# Patient Record
Sex: MALE | Race: WHITE | NOT HISPANIC OR LATINO | URBAN - NONMETROPOLITAN AREA
[De-identification: names, ages, dates, MRNs, and addresses within clinical notes are randomized per-mention and may not be internally consistent; named-entity substitution may affect disease eponyms.]

---

## 2018-05-02 ENCOUNTER — TRANSCRIBE ORDERS (OUTPATIENT)
Dept: ADMINISTRATIVE | Facility: HOSPITAL | Age: 63
End: 2018-05-02

## 2018-05-02 DIAGNOSIS — Z87.891 PERSONAL HISTORY OF TOBACCO USE, PRESENTING HAZARDS TO HEALTH: Primary | ICD-10-CM

## 2022-04-04 ENCOUNTER — OFFICE VISIT (OUTPATIENT)
Dept: INTERNAL MEDICINE | Facility: CLINIC | Age: 67
End: 2022-04-04

## 2022-04-04 VITALS
SYSTOLIC BLOOD PRESSURE: 114 MMHG | HEART RATE: 90 BPM | RESPIRATION RATE: 16 BRPM | OXYGEN SATURATION: 96 % | WEIGHT: 208 LBS | HEIGHT: 71 IN | DIASTOLIC BLOOD PRESSURE: 78 MMHG | BODY MASS INDEX: 29.12 KG/M2

## 2022-04-04 DIAGNOSIS — Z76.89 ENCOUNTER TO ESTABLISH CARE: ICD-10-CM

## 2022-04-04 DIAGNOSIS — Z23 NEED FOR VACCINATION FOR STREP PNEUMONIAE: ICD-10-CM

## 2022-04-04 DIAGNOSIS — Z12.11 ENCOUNTER FOR SCREENING COLONOSCOPY: ICD-10-CM

## 2022-04-04 DIAGNOSIS — F31.74 BIPOLAR DISORDER, IN FULL REMISSION, MOST RECENT EPISODE MANIC: Primary | ICD-10-CM

## 2022-04-04 PROBLEM — H35.30 MACULAR DEGENERATION OF BOTH EYES: Status: ACTIVE | Noted: 2022-04-04

## 2022-04-04 PROCEDURE — 90732 PPSV23 VACC 2 YRS+ SUBQ/IM: CPT

## 2022-04-04 PROCEDURE — 99204 OFFICE O/P NEW MOD 45 MIN: CPT

## 2022-04-04 PROCEDURE — G0009 ADMIN PNEUMOCOCCAL VACCINE: HCPCS

## 2022-04-04 RX ORDER — VILAZODONE HYDROCHLORIDE 40 MG/1
TABLET ORAL
COMMUNITY
Start: 2022-03-17

## 2022-04-04 RX ORDER — LAMOTRIGINE 150 MG/1
300 TABLET ORAL DAILY
COMMUNITY
Start: 2022-03-17

## 2022-04-04 RX ORDER — ANTIOX #8/OM3/DHA/EPA/LUT/ZEAX 250-2.5 MG
1 CAPSULE ORAL DAILY
COMMUNITY

## 2022-04-04 RX ORDER — BUPROPION HYDROCHLORIDE 300 MG/1
300 TABLET ORAL DAILY
COMMUNITY
Start: 2022-03-17

## 2022-04-04 RX ORDER — OLANZAPINE 15 MG/1
TABLET ORAL
COMMUNITY
Start: 2022-03-17

## 2022-04-04 RX ORDER — DIAZEPAM 5 MG/1
TABLET ORAL
COMMUNITY
Start: 2022-01-12

## 2022-04-04 NOTE — PROGRESS NOTES
Chief Complaint  Establish Care (Pt would like colonoscopy order, discuss meds)    Stefan Hughes presents to [facility]    Previous PCP: Stefan Day MD.     - Dr. Reggie Wren managing Wellbutrin, valium, lamotrigine, zyprexa, and viibryd for management of Bipolar Disorder. Reports that symptoms have been controlled for sometime. Follows with Dr. Wren every 3 months.     Subjective         [facility]  The following portions of the patient's history were reviewed and updated as appropriate: allergies, current medications, past family history, past medical history, past social history, past surgical history and problem list.     Past Medical History:   Diagnosis Date   • Anxiety    • Bipolar 1 disorder, mixed (HCC)    • Depression    • Intermediate stage nonexudative age-related macular degeneration of both eyes       No Known Allergies   Social History     Tobacco Use   • Smoking status: Former Smoker     Packs/day: 0.50     Types: Cigarettes     Start date: 1995     Quit date: 1997     Years since quittin.0   • Smokeless tobacco: Never Used   Substance Use Topics   • Alcohol use: Never   • Drug use: Never     Past Surgical History:   Procedure Laterality Date   • APPENDECTOMY        History reviewed. No pertinent family history.      Current Outpatient Medications:   •  buPROPion XL (WELLBUTRIN XL) 300 MG 24 hr tablet, Take 300 mg by mouth Daily., Disp: , Rfl:   •  diazePAM (VALIUM) 5 MG tablet, TAKE 1-2 TABLETS BY MOUTH THREE TIMES A DAY AS DIRECTED, Disp: , Rfl:   •  lamoTRIgine (LaMICtal) 150 MG tablet, Take 300 mg by mouth Daily., Disp: , Rfl:   •  multivitamins-minerals (PRESERVISION AREDS 2) capsule capsule, Take 1 capsule by mouth Daily., Disp: , Rfl:   •  OLANZapine (zyPREXA) 15 MG tablet, TAKE ONE TABLET BY MOUTH ONCE OR TWICE DAILY AS DIRECTED, Disp: , Rfl:   •  Viibryd 40 MG tablet tablet, TAKE 1/2 TO 1 TABLET BY MOUTH IN THE MORNING AS DIRECTED, Disp: , Rfl:  "    Review of Systems   Constitutional: Negative for activity change, appetite change, chills, fatigue and fever.   Respiratory: Negative for apnea, cough, choking, chest tightness, shortness of breath, wheezing and stridor.    Cardiovascular: Negative for chest pain, palpitations and leg swelling.   Skin: Negative for color change, dry skin, pallor, rash, skin lesions, wound and bruise.   Allergic/Immunologic: Negative for environmental allergies and food allergies.   Neurological: Negative for dizziness and confusion.       Objective   Vital Signs  /78   Pulse 90   Resp 16   Ht 180.3 cm (71\")   Wt 94.3 kg (208 lb)   SpO2 96%   BMI 29.01 kg/m²     Physical Exam  Constitutional:       Appearance: Normal appearance.      Comments: Overweight    HENT:      Head: Normocephalic.      Mouth/Throat:      Mouth: Mucous membranes are moist.      Pharynx: Oropharynx is clear.   Eyes:      Extraocular Movements: Extraocular movements intact.      Pupils: Pupils are equal, round, and reactive to light.   Cardiovascular:      Rate and Rhythm: Normal rate and regular rhythm.      Pulses: Normal pulses.      Heart sounds: Normal heart sounds.   Pulmonary:      Effort: Pulmonary effort is normal.      Breath sounds: Normal breath sounds.   Skin:     General: Skin is warm and dry.      Capillary Refill: Capillary refill takes less than 2 seconds.   Neurological:      Mental Status: He is alert and oriented to person, place, and time.   Psychiatric:         Mood and Affect: Mood normal.         Behavior: Behavior normal.         Thought Content: Thought content normal.         Judgment: Judgment normal.          Result Review :     The following data was reviewed by: LINA Forbes on 04/04/2022:    Assessment and Plan    1. Need for vaccination for Strep pneumoniae  - Pneumococcal Polysaccharide Vaccine 23-Valent (PPSV23) Greater Than or Equal To 3yo Subcutaneous / IM    2. Bipolar disorder, in full remission, most " recent episode manic (HCC)  - TSH Rfx On Abnormal To Free T4; Future  - Encouraged to continue to take medications as prescribed and continue to follow with psychiatry.     3. Encounter for screening colonoscopy  - Ambulatory Referral For Screening Colonoscopy    4. Encounter to establish care  - CBC & Differential; Future  - Comprehensive Metabolic Panel; Future  - TSH Rfx On Abnormal To Free T4; Future  - Lipid Panel; Future  - Hepatitis C Antibody; Future    5. Body mass index (BMI) 29.0-29.9, adult   - CBC & Differential; Future  - Comprehensive Metabolic Panel; Future  - TSH Rfx On Abnormal To Free T4; Future  - Lipid Panel; Future  - Overweight. Discussed need for improved diet and exercise.     Follow Up     Return in about 1 month (around 5/4/2022) for Medicare Wellness.    Patient was given instructions and counseling regarding his condition or for health maintenance advice. Please see specific information pulled into the AVS if appropriate.    Part of this note may be an electronic transcription/translation of spoken language to printed text using the Dragon Dictation System.    Electronically signed by:   LINA Forbes  04/04/2022

## 2022-04-08 ENCOUNTER — PATIENT ROUNDING (BHMG ONLY) (OUTPATIENT)
Dept: INTERNAL MEDICINE | Facility: CLINIC | Age: 67
End: 2022-04-08

## 2022-04-18 ENCOUNTER — LAB (OUTPATIENT)
Dept: LAB | Facility: HOSPITAL | Age: 67
End: 2022-04-18

## 2022-04-18 DIAGNOSIS — F31.74 BIPOLAR DISORDER, IN FULL REMISSION, MOST RECENT EPISODE MANIC: ICD-10-CM

## 2022-04-18 DIAGNOSIS — Z76.89 ENCOUNTER TO ESTABLISH CARE: ICD-10-CM

## 2022-04-18 LAB
BASOPHILS # BLD AUTO: 0.04 10*3/MM3 (ref 0–0.2)
BASOPHILS NFR BLD AUTO: 0.5 % (ref 0–1.5)
DEPRECATED RDW RBC AUTO: 41 FL (ref 37–54)
EOSINOPHIL # BLD AUTO: 0.24 10*3/MM3 (ref 0–0.4)
EOSINOPHIL NFR BLD AUTO: 2.8 % (ref 0.3–6.2)
ERYTHROCYTE [DISTWIDTH] IN BLOOD BY AUTOMATED COUNT: 12 % (ref 12.3–15.4)
HCT VFR BLD AUTO: 49.8 % (ref 37.5–51)
HGB BLD-MCNC: 16.7 G/DL (ref 13–17.7)
IMM GRANULOCYTES # BLD AUTO: 0.01 10*3/MM3 (ref 0–0.05)
IMM GRANULOCYTES NFR BLD AUTO: 0.1 % (ref 0–0.5)
LYMPHOCYTES # BLD AUTO: 4.73 10*3/MM3 (ref 0.7–3.1)
LYMPHOCYTES NFR BLD AUTO: 54.5 % (ref 19.6–45.3)
MCH RBC QN AUTO: 31.4 PG (ref 26.6–33)
MCHC RBC AUTO-ENTMCNC: 33.5 G/DL (ref 31.5–35.7)
MCV RBC AUTO: 93.6 FL (ref 79–97)
MONOCYTES # BLD AUTO: 0.64 10*3/MM3 (ref 0.1–0.9)
MONOCYTES NFR BLD AUTO: 7.4 % (ref 5–12)
NEUTROPHILS NFR BLD AUTO: 3.02 10*3/MM3 (ref 1.7–7)
NEUTROPHILS NFR BLD AUTO: 34.7 % (ref 42.7–76)
NRBC BLD AUTO-RTO: 0 /100 WBC (ref 0–0.2)
PLATELET # BLD AUTO: 261 10*3/MM3 (ref 140–450)
PMV BLD AUTO: 11.6 FL (ref 6–12)
RBC # BLD AUTO: 5.32 10*6/MM3 (ref 4.14–5.8)
WBC NRBC COR # BLD: 8.68 10*3/MM3 (ref 3.4–10.8)

## 2022-04-18 PROCEDURE — 80061 LIPID PANEL: CPT

## 2022-04-18 PROCEDURE — 86803 HEPATITIS C AB TEST: CPT

## 2022-04-18 PROCEDURE — 84443 ASSAY THYROID STIM HORMONE: CPT

## 2022-04-18 PROCEDURE — 80053 COMPREHEN METABOLIC PANEL: CPT

## 2022-04-18 PROCEDURE — 85025 COMPLETE CBC W/AUTO DIFF WBC: CPT

## 2022-04-19 LAB
ALBUMIN SERPL-MCNC: 4.8 G/DL (ref 3.5–5.2)
ALBUMIN/GLOB SERPL: 1.8 G/DL
ALP SERPL-CCNC: 64 U/L (ref 39–117)
ALT SERPL W P-5'-P-CCNC: 17 U/L (ref 1–41)
ANION GAP SERPL CALCULATED.3IONS-SCNC: 11 MMOL/L (ref 5–15)
AST SERPL-CCNC: 20 U/L (ref 1–40)
BILIRUB SERPL-MCNC: 0.2 MG/DL (ref 0–1.2)
BUN SERPL-MCNC: 19 MG/DL (ref 8–23)
BUN/CREAT SERPL: 17.6 (ref 7–25)
CALCIUM SPEC-SCNC: 9.7 MG/DL (ref 8.6–10.5)
CHLORIDE SERPL-SCNC: 106 MMOL/L (ref 98–107)
CHOLEST SERPL-MCNC: 261 MG/DL (ref 0–200)
CO2 SERPL-SCNC: 24 MMOL/L (ref 22–29)
CREAT SERPL-MCNC: 1.08 MG/DL (ref 0.76–1.27)
EGFRCR SERPLBLD CKD-EPI 2021: 74.3 ML/MIN/1.73
GLOBULIN UR ELPH-MCNC: 2.7 GM/DL
GLUCOSE SERPL-MCNC: 67 MG/DL (ref 65–99)
HCV AB SER DONR QL: NORMAL
HDLC SERPL-MCNC: 53 MG/DL (ref 40–60)
LDLC SERPL CALC-MCNC: 170 MG/DL (ref 0–100)
LDLC/HDLC SERPL: 3.16 {RATIO}
POTASSIUM SERPL-SCNC: 4.1 MMOL/L (ref 3.5–5.2)
PROT SERPL-MCNC: 7.5 G/DL (ref 6–8.5)
SODIUM SERPL-SCNC: 141 MMOL/L (ref 136–145)
TRIGL SERPL-MCNC: 203 MG/DL (ref 0–150)
TSH SERPL DL<=0.05 MIU/L-ACNC: 2.39 UIU/ML (ref 0.27–4.2)
VLDLC SERPL-MCNC: 38 MG/DL (ref 5–40)

## 2022-04-26 DIAGNOSIS — Z12.11 ENCOUNTER FOR SCREENING COLONOSCOPY: Primary | ICD-10-CM

## 2022-05-02 ENCOUNTER — OFFICE VISIT (OUTPATIENT)
Dept: INTERNAL MEDICINE | Facility: CLINIC | Age: 67
End: 2022-05-02

## 2022-05-02 VITALS
SYSTOLIC BLOOD PRESSURE: 138 MMHG | WEIGHT: 205 LBS | DIASTOLIC BLOOD PRESSURE: 88 MMHG | HEIGHT: 69 IN | HEART RATE: 88 BPM | OXYGEN SATURATION: 97 % | TEMPERATURE: 97.9 F | BODY MASS INDEX: 30.36 KG/M2

## 2022-05-02 DIAGNOSIS — E78.5 DYSLIPIDEMIA: Primary | ICD-10-CM

## 2022-05-02 DIAGNOSIS — Z00.00 MEDICARE ANNUAL WELLNESS VISIT, SUBSEQUENT: ICD-10-CM

## 2022-05-02 PROCEDURE — 99214 OFFICE O/P EST MOD 30 MIN: CPT

## 2022-05-02 PROCEDURE — 1170F FXNL STATUS ASSESSED: CPT

## 2022-05-02 PROCEDURE — 1159F MED LIST DOCD IN RCRD: CPT

## 2022-05-02 PROCEDURE — G0439 PPPS, SUBSEQ VISIT: HCPCS

## 2022-05-02 RX ORDER — ATORVASTATIN CALCIUM 20 MG/1
20 TABLET, FILM COATED ORAL DAILY
Qty: 90 TABLET | Refills: 2 | Status: SHIPPED | OUTPATIENT
Start: 2022-05-02

## 2022-05-02 NOTE — PROGRESS NOTES
The ABCs of the Annual Wellness Visit  Subsequent Medicare Wellness Visit    Chief Complaint   Patient presents with   • Medicare Wellness-subsequent      Subjective    History of Present Illness:  Stefan Hughes is a 67 y.o. male who presents for a Subsequent Medicare Wellness Visit.    The following portions of the patient's history were reviewed and   updated as appropriate: allergies, current medications, past family history, past medical history, past social history, past surgical history and problem list.    Compared to one year ago, the patient feels his physical   health is the same.    Compared to one year ago, the patient feels his mental   health is the same.    Recent Hospitalizations:  He was not admitted to the hospital during the last year.       Current Medical Providers:  Patient Care Team:  Lewis Torres APRN as PCP - General (Nurse Practitioner)    Outpatient Medications Prior to Visit   Medication Sig Dispense Refill   • buPROPion XL (WELLBUTRIN XL) 300 MG 24 hr tablet Take 300 mg by mouth Daily.     • diazePAM (VALIUM) 5 MG tablet TAKE 1-2 TABLETS BY MOUTH THREE TIMES A DAY AS DIRECTED     • lamoTRIgine (LaMICtal) 150 MG tablet Take 300 mg by mouth Daily.     • multivitamins-minerals (PRESERVISION AREDS 2) capsule capsule Take 1 capsule by mouth Daily.     • OLANZapine (zyPREXA) 15 MG tablet TAKE ONE TABLET BY MOUTH ONCE OR TWICE DAILY AS DIRECTED     • Viibryd 40 MG tablet tablet TAKE 1/2 TO 1 TABLET BY MOUTH IN THE MORNING AS DIRECTED     • Sod Picosulfate-Mag Ox-Cit Acd 10-3.5-12 MG-GM -GM/160ML solution Take 160 mL by mouth Take As Directed for 2 doses. 320 mL 0     No facility-administered medications prior to visit.       No opioid medication identified on active medication list. I have reviewed chart for other potential  high risk medication/s and harmful drug interactions in the elderly.          Aspirin is not on active medication list.  Aspirin use is indicated based on review of  "current medical condition/s. Pros and cons of this therapy have been discussed with this patient. Benefits of this medication outweigh potential harm.  Patient has been instructed to start taking this medication..    Patient Active Problem List   Diagnosis   • Bipolar disorder, in full remission, most recent episode manic (HCC)   • Macular degeneration of both eyes   • Dyslipidemia     Advance Care Planning  Advance Directive is not on file.  ACP discussion was held with the patient during this visit. Patient does not have an advance directive, information provided.    Review of Systems   Constitutional: Negative.    HENT: Negative.    Eyes: Negative.    Respiratory: Negative.    Cardiovascular: Negative.    Gastrointestinal: Negative.    Endocrine: Negative.    Genitourinary: Negative.    Musculoskeletal: Negative.    Skin: Negative.    Allergic/Immunologic: Negative.    Neurological: Negative.    Hematological: Negative.    Psychiatric/Behavioral: Negative.         Objective    Vitals:    05/02/22 0839 05/02/22 0905   BP: 138/88    Pulse: 105 88   Temp: 97.9 °F (36.6 °C)    SpO2: 97%    Weight: 93 kg (205 lb)    Height: 176 cm (69.29\")      BMI Readings from Last 1 Encounters:   05/02/22 30.02 kg/m²   BMI is above normal parameters. Recommendations include: exercise counseling and nutrition counseling    Does the patient have evidence of cognitive impairment? No    Physical Exam  Constitutional:       Appearance: Normal appearance. He is obese.   HENT:      Head: Normocephalic.      Mouth/Throat:      Mouth: Mucous membranes are moist.      Pharynx: Oropharynx is clear.   Eyes:      Extraocular Movements: Extraocular movements intact.      Conjunctiva/sclera: Conjunctivae normal.      Pupils: Pupils are equal, round, and reactive to light.   Cardiovascular:      Rate and Rhythm: Normal rate and regular rhythm.      Pulses: Normal pulses.      Heart sounds: Normal heart sounds.   Pulmonary:      Effort: Pulmonary " effort is normal.      Breath sounds: Normal breath sounds.   Abdominal:      General: Bowel sounds are normal.      Palpations: Abdomen is soft.   Skin:     General: Skin is warm and dry.      Capillary Refill: Capillary refill takes less than 2 seconds.   Neurological:      Mental Status: He is alert and oriented to person, place, and time.   Psychiatric:         Mood and Affect: Mood normal.         Behavior: Behavior normal.         Thought Content: Thought content normal.         Judgment: Judgment normal.       Lab Results   Component Value Date    TRIG 203 (H) 2022    HDL 53 2022     (H) 2022    VLDL 38 2022            HEALTH RISK ASSESSMENT    Smoking Status:  Social History     Tobacco Use   Smoking Status Former Smoker   • Packs/day: 0.50   • Types: Cigarettes   • Start date: 1995   • Quit date: 1997   • Years since quittin.0   Smokeless Tobacco Never Used     Alcohol Consumption:  Social History     Substance and Sexual Activity   Alcohol Use Never     Fall Risk Screen:    STEADI Fall Risk Assessment was completed, and patient is at LOW risk for falls.Assessment completed on:2022    Depression Screening:  PHQ-2/PHQ-9 Depression Screening 2022   Little Interest or Pleasure in Doing Things 0-->not at all   Feeling Down, Depressed or Hopeless 0-->not at all   PHQ-9: Brief Depression Severity Measure Score 0       Health Habits and Functional and Cognitive Screening:  No flowsheet data found.    Age-appropriate Screening Schedule:  Refer to the list below for future screening recommendations based on patient's age, sex and/or medical conditions. Orders for these recommended tests are listed in the plan section. The patient has been provided with a written plan.    Health Maintenance   Topic Date Due   • ZOSTER VACCINE (1 of 2) 2023 (Originally 2003)   • INFLUENZA VACCINE  2022   • TDAP/TD VACCINES (2 - Tdap) 2026               Assessment/Plan   CMS Preventative Services Quick Reference  Risk Factors Identified During Encounter  Depression/Dysphoria  Inactivity/Sedentary  Obesity/Overweight   The above risks/problems have been discussed with the patient.  Follow up actions/plans if indicated are seen below in the Assessment/Plan Section.  Pertinent information has been shared with the patient in the After Visit Summary.    Diagnoses and all orders for this visit:    1. Dyslipidemia (Primary)  -     atorvastatin (Lipitor) 20 MG tablet; Take 1 tablet by mouth Daily.  Dispense: 90 tablet; Refill: 2  -     Lipid panel  -     CMP        -     Uncontrolled. Discussed increased ASCVD risk and recommendation for statin. Patient was agreeable with starting statin therapy. Will recheck lipid panel in 4-12 weeks to evaluate for improvement. Will intensify statin as needed and tolerated.     2. Medicare annual wellness visit, subsequent       -      Nutrition and activity goals reviewed including: mainly water to drink, limit white flour/processed sugar; increase high protein, high fiber carbs, good breakfast, working toward 150 mins cardio per week, resistance training 2x/week.    The patient is here for a health maintenance visit.  Screening lab work is ordered.  Immunizations are reported as current.  Advice and education is given regarding nutrition, aerobic exercise, routine dental evaluations, routine eye exams, reproductive health, cardiovascular risk reduction.  Further recommendations after lab evaluation.  Annual wellness evaluations recommended.     I discussed the patients findings and my recommendations with patient.  Patient was encouraged to keep me informed of any acute changes or any new concerning symptoms.  Patient voiced understanding of all instructions and denied further questions.      Follow Up:   Return in about 1 year (around 5/2/2023) for Medicare Wellness.     An After Visit Summary and PPPS were made available to the  patient.

## 2022-05-10 ENCOUNTER — TELEPHONE (OUTPATIENT)
Dept: GASTROENTEROLOGY | Facility: CLINIC | Age: 67
End: 2022-05-10

## 2022-05-18 ENCOUNTER — OUTSIDE FACILITY SERVICE (OUTPATIENT)
Dept: GASTROENTEROLOGY | Facility: CLINIC | Age: 67
End: 2022-05-18

## 2022-05-18 PROCEDURE — 45385 COLONOSCOPY W/LESION REMOVAL: CPT | Performed by: INTERNAL MEDICINE

## 2022-05-18 PROCEDURE — 45380 COLONOSCOPY AND BIOPSY: CPT | Performed by: INTERNAL MEDICINE

## 2022-05-18 PROCEDURE — 88305 TISSUE EXAM BY PATHOLOGIST: CPT | Performed by: INTERNAL MEDICINE

## 2022-05-19 ENCOUNTER — LAB REQUISITION (OUTPATIENT)
Dept: LAB | Facility: HOSPITAL | Age: 67
End: 2022-05-19

## 2022-05-19 DIAGNOSIS — Z12.11 ENCOUNTER FOR SCREENING FOR MALIGNANT NEOPLASM OF COLON: ICD-10-CM

## 2022-05-19 DIAGNOSIS — Z83.71 FAMILY HISTORY OF COLONIC POLYPS: ICD-10-CM

## 2022-05-19 DIAGNOSIS — D12.0 BENIGN NEOPLASM OF CECUM: ICD-10-CM

## 2022-05-19 DIAGNOSIS — D12.5 BENIGN NEOPLASM OF SIGMOID COLON: ICD-10-CM

## 2022-05-19 DIAGNOSIS — K64.8 OTHER HEMORRHOIDS: ICD-10-CM

## 2022-05-20 ENCOUNTER — TELEPHONE (OUTPATIENT)
Dept: GASTROENTEROLOGY | Facility: CLINIC | Age: 67
End: 2022-05-20

## 2022-05-20 LAB
CYTO UR: NORMAL
LAB AP CASE REPORT: NORMAL
LAB AP CLINICAL INFORMATION: NORMAL
PATH REPORT.FINAL DX SPEC: NORMAL
PATH REPORT.GROSS SPEC: NORMAL

## 2023-05-16 ENCOUNTER — OFFICE VISIT (OUTPATIENT)
Dept: INTERNAL MEDICINE | Facility: CLINIC | Age: 68
End: 2023-05-16
Payer: MEDICARE

## 2023-05-16 VITALS
HEART RATE: 92 BPM | BODY MASS INDEX: 29.26 KG/M2 | SYSTOLIC BLOOD PRESSURE: 110 MMHG | OXYGEN SATURATION: 93 % | WEIGHT: 209 LBS | DIASTOLIC BLOOD PRESSURE: 78 MMHG | TEMPERATURE: 97 F | HEIGHT: 71 IN

## 2023-05-16 DIAGNOSIS — E78.5 DYSLIPIDEMIA: ICD-10-CM

## 2023-05-16 DIAGNOSIS — Q24.9 CARDIAC ABNORMALITY: ICD-10-CM

## 2023-05-16 DIAGNOSIS — Z76.89 ENCOUNTER TO ESTABLISH CARE: Primary | ICD-10-CM

## 2023-05-16 DIAGNOSIS — R33.9 INCOMPLETE BLADDER EMPTYING: ICD-10-CM

## 2023-05-16 DIAGNOSIS — G47.33 OSA (OBSTRUCTIVE SLEEP APNEA): ICD-10-CM

## 2023-05-16 DIAGNOSIS — R94.31 LEFT AXIS DEVIATION: ICD-10-CM

## 2023-05-16 DIAGNOSIS — F41.1 GAD (GENERALIZED ANXIETY DISORDER): ICD-10-CM

## 2023-05-16 DIAGNOSIS — F31.74 BIPOLAR DISORDER, IN FULL REMISSION, MOST RECENT EPISODE MANIC: ICD-10-CM

## 2023-05-16 DIAGNOSIS — G89.29 CHRONIC PAIN OF RIGHT KNEE: ICD-10-CM

## 2023-05-16 DIAGNOSIS — M25.561 CHRONIC PAIN OF RIGHT KNEE: ICD-10-CM

## 2023-05-16 RX ORDER — TAMSULOSIN HYDROCHLORIDE 0.4 MG/1
1 CAPSULE ORAL DAILY
COMMUNITY
End: 2023-05-16

## 2023-05-16 RX ORDER — TAMSULOSIN HYDROCHLORIDE 0.4 MG/1
1 CAPSULE ORAL DAILY
Qty: 30 CAPSULE | Refills: 2 | Status: SHIPPED | OUTPATIENT
Start: 2023-05-16

## 2023-05-17 ENCOUNTER — HOSPITAL ENCOUNTER (OUTPATIENT)
Dept: GENERAL RADIOLOGY | Facility: HOSPITAL | Age: 68
Discharge: HOME | End: 2023-05-17
Admitting: NURSE PRACTITIONER
Payer: MEDICARE

## 2023-05-17 DIAGNOSIS — G89.29 CHRONIC PAIN OF RIGHT KNEE: ICD-10-CM

## 2023-05-17 DIAGNOSIS — M25.561 CHRONIC PAIN OF RIGHT KNEE: ICD-10-CM

## 2023-05-17 PROCEDURE — 73562 X-RAY EXAM OF KNEE 3: CPT

## 2023-05-18 NOTE — PROGRESS NOTES
"    Office Note     Name: Stefan Hughes    : 1955     MRN: [id number]     Chief Complaint  Establish Care, Knee Pain (Right knee. Old injury that is starting to bother him again. ), Sleep Apnea (Would like referral to Sleep Medicine ), Urinary Incontinence (Has been taking Tamsulosin ), and Heart Problem (Was told he had a Heart aberration when he went to get colonoscopy. )    Subjective     History of Present Illness:  Stefan Hughes is a 68 y.o. male who presents today to establish care with a new provider.  Medical history and current home medications reviewed with the patient.  Patient does follow with psych Dr. Joseph Wren and has been a patient established with him for 23 years.  He manages the patient's Wellbutrin, Valium, Lamictal, and Viibryd.  Patient does have anxiety, depression, and bipolar 1 disorder.  Patient does feel these are stable and well-controlled at this time.  Patient had a colonoscopy on 2022 and was told that he was found to have a \"heart aberration\" on the monitor during his scope.  He did not ask any questions or any explanation of this comment.  He has not ever seen cardiology.  He denies any cardiac symptoms.  No recommendations were made at that time.  No documentation of this noted in the chart.  Patient also complains of right knee pain.  He used to run regularly but had a torn meniscus about 30 years ago.  He now reports difficulty with weightbearing at times.  He is unable to run as this causes increased pain.  He would also like to have a referral to sleep medicine for evaluation of sleep apnea.  He has noticed some recent urinary incontinence and feeling of inadequate emptying of his bladder.  It appears he has previously been on tamsulosin but is not currently taking this medication.  He is interested in restarting this medication.  He does report that his relative takes this medication and is helpful for similar symptoms.  He denies further complaints or concerns " "at this time.  He was previously seen by Lewis a former provider of this clinic.  He presents to establish care.    Review of Systems   Musculoskeletal: Positive for arthralgias.       Past Medical History:   Diagnosis Date   • Anxiety    • Bipolar 1 disorder, mixed    • Depression    • Intermediate stage nonexudative age-related macular degeneration of both eyes        Past Surgical History:   Procedure Laterality Date   • APPENDECTOMY         Social History     Socioeconomic History   • Marital status: Single   Tobacco Use   • Smoking status: Former     Packs/day: 0.50     Years: 0.00     Pack years: 0.00     Types: Cigarettes     Start date: 1995     Quit date: 1997     Years since quittin.0   • Smokeless tobacco: Never   Substance and Sexual Activity   • Alcohol use: Never   • Drug use: Never   • Sexual activity: Not Currently         Current Outpatient Medications:   •  buPROPion XL (WELLBUTRIN XL) 300 MG 24 hr tablet, Take 1 tablet by mouth Daily., Disp: , Rfl:   •  diazePAM (VALIUM) 5 MG tablet, TAKE 1-2 TABLETS BY MOUTH THREE TIMES A DAY AS DIRECTED, Disp: , Rfl:   •  lamoTRIgine (LaMICtal) 150 MG tablet, Take 2 tablets by mouth Daily., Disp: , Rfl:   •  multivitamins-minerals (PRESERVISION AREDS 2) capsule capsule, Take 1 capsule by mouth Daily., Disp: , Rfl:   •  Viibryd 40 MG tablet tablet, TAKE 1/2 TO 1 TABLET BY MOUTH IN THE MORNING AS DIRECTED, Disp: , Rfl:   •  tamsulosin (FLOMAX) 0.4 MG capsule 24 hr capsule, Take 1 capsule by mouth Daily., Disp: 30 capsule, Rfl: 2    Objective     Vital Signs  /78   Pulse 92   Temp 97 °F (36.1 °C)   Ht 180.3 cm (71\")   Wt 94.8 kg (209 lb)   SpO2 93%   BMI 29.15 kg/m²   Estimated body mass index is 29.15 kg/m² as calculated from the following:    Height as of this encounter: 180.3 cm (71\").    Weight as of this encounter: 94.8 kg (209 lb).    BMI is >= 25 and <30. (Overweight) The following options were offered after discussion;: Not " addressed at this visit      Physical Exam  Constitutional:       Appearance: Normal appearance.   HENT:      Head: Normocephalic and atraumatic.      Nose: Nose normal.   Eyes:      Extraocular Movements: Extraocular movements intact.      Conjunctiva/sclera: Conjunctivae normal.      Pupils: Pupils are equal, round, and reactive to light.      Comments: Glasses in place   Cardiovascular:      Rate and Rhythm: Normal rate and regular rhythm.   Pulmonary:      Effort: Pulmonary effort is normal. No respiratory distress.      Breath sounds: Normal breath sounds.   Musculoskeletal:         General: Normal range of motion.      Cervical back: Normal range of motion and neck supple.   Skin:     General: Skin is warm and dry.   Neurological:      General: No focal deficit present.      Mental Status: He is alert and oriented to person, place, and time. Mental status is at baseline.   Psychiatric:         Mood and Affect: Mood normal.         Behavior: Behavior normal.         Thought Content: Thought content normal.         Judgment: Judgment normal.       ECG 12 Lead    Date/Time: 5/16/2023 1:21 PM  Performed by: Evonne Garcia MA  Authorized by: Areli Recinos APRN   Comparison: not compared with previous ECG   Previous ECG: no previous ECG available  Rhythm: sinus rhythm  Rate: normal  BPM: 72  QRS axis: left    Clinical impression: abnormal EKG  Comments: Sinus rhythm  Marked left axis deviation  Moderate intraventricular conduction delay  Abnormal EKG               Assessment and Plan     Diagnoses and all orders for this visit:    1. Encounter to establish care (Primary)    2. Bipolar disorder, in full remission, most recent episode manic    3. Dyslipidemia    4. SANDI (generalized anxiety disorder)    5. DALY (obstructive sleep apnea)  -     Ambulatory Referral to Sleep Medicine    6. Chronic pain of right knee  -     XR Knee 3 View Right; Future    7. Left axis deviation  -     Stress test with myocardial perfusion;  Future  -     ECG 12 Lead    8. Incomplete bladder emptying  -     tamsulosin (FLOMAX) 0.4 MG capsule 24 hr capsule; Take 1 capsule by mouth Daily.  Dispense: 30 capsule; Refill: 2    9. Cardiac abnormality  -     ECG 12 Lead    Plan:  Patient presents to establish care with a new provider.  Anxiety, depression, bipolar 1 well-controlled on the current medications.  Continue to follow with Dr. Wren with psych.  Will restart tamsulosin daily for urinary symptoms.  EKG done in the office today.  EKG shows sinus rhythm with a rate of 72 bpm.  Marked left axis deviation, moderate intraventricular conduction delay, abnormal EKG.  Will order stress test for further cardiac evaluation.  Referral placed to sleep medicine for evaluation of obstructive sleep apnea.  X-ray of right knee ordered.  Further plan of care based on imaging results.  Plan for follow-up in 1 month.  Return to clinic sooner if needed.    Go to ED for further evaluation if any symptom worsens.    Follow Up  Return in about 1 month (around 6/16/2023) for Recheck.    LINA Guy    Part of this note may be an electronic transcription/translation of spoken language to printed text using the Dragon Dictation System.

## 2023-05-19 ENCOUNTER — TELEPHONE (OUTPATIENT)
Dept: INTERNAL MEDICINE | Facility: CLINIC | Age: 68
End: 2023-05-19
Payer: COMMERCIAL

## 2023-05-19 NOTE — TELEPHONE ENCOUNTER
PATIENT HAS CALLED REQUESTING A CALL BACK WITH RESULTS OF XRAY'S OF RIGHT KNEE THAT WERE DONE 05/17/23.    CALL BACK NUMBER [phone]

## 2023-05-22 DIAGNOSIS — M25.561 CHRONIC PAIN OF RIGHT KNEE: Primary | ICD-10-CM

## 2023-05-22 DIAGNOSIS — G89.29 CHRONIC PAIN OF RIGHT KNEE: Primary | ICD-10-CM

## 2023-06-13 ENCOUNTER — LAB (OUTPATIENT)
Dept: LAB | Facility: HOSPITAL | Age: 68
End: 2023-06-13
Payer: MEDICARE

## 2023-06-13 ENCOUNTER — OFFICE VISIT (OUTPATIENT)
Dept: INTERNAL MEDICINE | Facility: CLINIC | Age: 68
End: 2023-06-13
Payer: MEDICARE

## 2023-06-13 VITALS
HEART RATE: 80 BPM | BODY MASS INDEX: 28.56 KG/M2 | HEIGHT: 71 IN | WEIGHT: 204 LBS | DIASTOLIC BLOOD PRESSURE: 84 MMHG | OXYGEN SATURATION: 96 % | SYSTOLIC BLOOD PRESSURE: 124 MMHG | TEMPERATURE: 97.4 F

## 2023-06-13 DIAGNOSIS — E78.5 DYSLIPIDEMIA: ICD-10-CM

## 2023-06-13 DIAGNOSIS — Z09 FOLLOW-UP EXAM: Primary | ICD-10-CM

## 2023-06-13 DIAGNOSIS — F31.74 BIPOLAR DISORDER, IN FULL REMISSION, MOST RECENT EPISODE MANIC: ICD-10-CM

## 2023-06-13 LAB
ALBUMIN SERPL-MCNC: 4.4 G/DL (ref 3.5–5.2)
ALBUMIN/GLOB SERPL: 1.8 G/DL
ALP SERPL-CCNC: 67 U/L (ref 39–117)
ALT SERPL W P-5'-P-CCNC: 16 U/L (ref 1–41)
ANION GAP SERPL CALCULATED.3IONS-SCNC: 9.7 MMOL/L (ref 5–15)
AST SERPL-CCNC: 17 U/L (ref 1–40)
BILIRUB SERPL-MCNC: <0.2 MG/DL (ref 0–1.2)
BUN SERPL-MCNC: 17 MG/DL (ref 8–23)
BUN/CREAT SERPL: 12.3 (ref 7–25)
CALCIUM SPEC-SCNC: 9.8 MG/DL (ref 8.6–10.5)
CHLORIDE SERPL-SCNC: 104 MMOL/L (ref 98–107)
CHOLEST SERPL-MCNC: 253 MG/DL (ref 0–200)
CO2 SERPL-SCNC: 22.3 MMOL/L (ref 22–29)
CREAT SERPL-MCNC: 1.38 MG/DL (ref 0.76–1.27)
EGFRCR SERPLBLD CKD-EPI 2021: 55 ML/MIN/1.73
GLOBULIN UR ELPH-MCNC: 2.5 GM/DL
GLUCOSE SERPL-MCNC: 108 MG/DL (ref 65–99)
HDLC SERPL-MCNC: 54 MG/DL (ref 40–60)
LDLC SERPL CALC-MCNC: 166 MG/DL (ref 0–100)
LDLC/HDLC SERPL: 3.01 {RATIO}
POTASSIUM SERPL-SCNC: 4.2 MMOL/L (ref 3.5–5.2)
PROT SERPL-MCNC: 6.9 G/DL (ref 6–8.5)
SODIUM SERPL-SCNC: 136 MMOL/L (ref 136–145)
TRIGL SERPL-MCNC: 182 MG/DL (ref 0–150)
VLDLC SERPL-MCNC: 33 MG/DL (ref 5–40)

## 2023-06-13 PROCEDURE — 36415 COLL VENOUS BLD VENIPUNCTURE: CPT | Performed by: NURSE PRACTITIONER

## 2023-06-13 PROCEDURE — 80061 LIPID PANEL: CPT | Performed by: NURSE PRACTITIONER

## 2023-06-13 PROCEDURE — 80053 COMPREHEN METABOLIC PANEL: CPT | Performed by: NURSE PRACTITIONER

## 2023-06-13 RX ORDER — OLANZAPINE 15 MG/1
TABLET ORAL
COMMUNITY
Start: 2023-06-01

## 2023-06-13 RX ORDER — QUETIAPINE FUMARATE 25 MG/1
TABLET, FILM COATED ORAL
COMMUNITY
Start: 2023-05-31

## 2023-06-13 RX ORDER — CITALOPRAM 40 MG/1
TABLET ORAL
COMMUNITY
Start: 2023-05-24

## 2023-06-13 NOTE — PROGRESS NOTES
Office Note     Name: Stefan Hughes    : 1955     MRN: [id number]     Chief Complaint  Follow-up, Hyperlipidemia, and Anxiety    Subjective     History of Present Illness:  Stefan Hughes is a 68 y.o. male who presents today for a follow-up visit.  Patient reports he was notified about physical therapy but feels that his knee discomfort has improved and he is relatively asymptomatic at this time.  He would like to hold on pursuing physical therapy at this time.  He does see behavioral health and was started on Seroquel daily about 2 weeks ago.  He reports since being on the Seroquel he is experienced little to no anxiety.  He is very pleased about this.  He has an upcoming stress test scheduled for  at 7:30 AM.  He also has a consultation with Dr. Wakefield with sleep medicine on  at 1030 for evaluation and possible sleep study to evaluate for obstructive sleep apnea.  Otherwise, the patient reports doing well without complaints or concerns at this time.  Had a pleasant visit with the patient today.      Past Medical History:   Diagnosis Date    Anxiety     Bipolar 1 disorder, mixed     Depression     Intermediate stage nonexudative age-related macular degeneration of both eyes        Past Surgical History:   Procedure Laterality Date    APPENDECTOMY         Social History     Socioeconomic History    Marital status: Single   Tobacco Use    Smoking status: Former     Packs/day: 0.50     Years: 0.00     Pack years: 0.00     Types: Cigarettes     Start date: 1995     Quit date: 1997     Years since quittin.1    Smokeless tobacco: Never   Substance and Sexual Activity    Alcohol use: Never    Drug use: Never    Sexual activity: Not Currently         Current Outpatient Medications:     buPROPion XL (WELLBUTRIN XL) 300 MG 24 hr tablet, Take 1 tablet by mouth Daily., Disp: , Rfl:     citalopram (CeleXA) 40 MG tablet, , Disp: , Rfl:     diazePAM (VALIUM) 5 MG tablet, TAKE 1-2 TABLETS BY  "MOUTH THREE TIMES A DAY AS DIRECTED, Disp: , Rfl:     lamoTRIgine (LaMICtal) 150 MG tablet, Take 2 tablets by mouth Daily., Disp: , Rfl:     multivitamins-minerals (PRESERVISION AREDS 2) capsule capsule, Take 1 capsule by mouth Daily., Disp: , Rfl:     OLANZapine (zyPREXA) 15 MG tablet, , Disp: , Rfl:     QUEtiapine (SEROquel) 25 MG tablet, , Disp: , Rfl:     tamsulosin (FLOMAX) 0.4 MG capsule 24 hr capsule, Take 1 capsule by mouth Daily., Disp: 30 capsule, Rfl: 2    Viibryd 40 MG tablet tablet, TAKE 1/2 TO 1 TABLET BY MOUTH IN THE MORNING AS DIRECTED, Disp: , Rfl:     Objective     Vital Signs  /84   Pulse 80   Temp 97.4 °F (36.3 °C)   Ht 180.3 cm (70.98\")   Wt 92.5 kg (204 lb)   SpO2 96%   BMI 28.47 kg/m²   Estimated body mass index is 28.47 kg/m² as calculated from the following:    Height as of this encounter: 180.3 cm (70.98\").    Weight as of this encounter: 92.5 kg (204 lb).            Physical Exam  Constitutional:       Appearance: Normal appearance.   HENT:      Head: Normocephalic and atraumatic.      Nose: Nose normal.   Eyes:      Extraocular Movements: Extraocular movements intact.      Conjunctiva/sclera: Conjunctivae normal.      Pupils: Pupils are equal, round, and reactive to light.      Comments: Glasses in place   Cardiovascular:      Rate and Rhythm: Normal rate and regular rhythm.      Heart sounds: Normal heart sounds.   Pulmonary:      Effort: Pulmonary effort is normal. No respiratory distress.      Breath sounds: Normal breath sounds.   Musculoskeletal:         General: Normal range of motion.      Cervical back: Normal range of motion and neck supple.   Skin:     General: Skin is warm and dry.   Neurological:      General: No focal deficit present.      Mental Status: He is alert and oriented to person, place, and time. Mental status is at baseline.   Psychiatric:         Mood and Affect: Mood normal.         Behavior: Behavior normal.         Thought Content: Thought content " normal.         Judgment: Judgment normal.        Assessment and Plan     Diagnoses and all orders for this visit:    1. Follow-up exam (Primary)    2. Dyslipidemia  -     Comprehensive metabolic panel  -     Lipid Panel    3. Bipolar disorder, in full remission, most recent episode manic    Plan:  Patient would like to have his fasting lipid panel redrawn today.  Continue with medication recommendations per psych/behavioral health.  Keep upcoming appointment for stress test.  Keep upcoming appointment with sleep medicine.  Plan for 1 month follow-up visit on chronic conditions.  Return to clinic sooner if needed.    Follow Up  Return in about 1 month (around 7/13/2023) for Recheck.    LINA Guy    Part of this note may be an electronic transcription/translation of spoken language to printed text using the Dragon Dictation System.

## 2023-08-30 ENCOUNTER — TELEPHONE (OUTPATIENT)
Dept: INTERNAL MEDICINE | Facility: CLINIC | Age: 68
End: 2023-08-30

## 2023-08-30 NOTE — TELEPHONE ENCOUNTER
"  Caller: Stefan Hughes \"Howie\"    Relationship: Self    Best call back number: [phone]    What is the best time to reach you: ANYTIME     Who are you requesting to speak with (clinical staff, provider,  specific staff member): DOCTOR OR NURSE         What was the call regarding: THE PATIENT STATES THAT HE HAD AN APPOINTMENT WITH HIS EYE DOCTOR DOCTOR MAGUE AT [facility] THE PATIENT STATES THAT HE WAS TELLING THE DOCTOR ABOUT A BLACK OUT EPISODE THAT HE HAD IN THE RIGHT EYE DOCTOR MAGUE TOLD HIM THAT IT COULD BE SOMETHING CALLED AMAVLOSIS FUGAV AND THAT IT COULD BE A PRECURSOR TO STROKE THE EYE DOCTOR ADVISED THE PATIENT TO HAVE A CORODID DOPPLER BILATERAL TEST. THE PATIENT WOULD LIKE TO KNOW WHAT THAT TEST WOULD INVOLVE. THE PATIENT ALSO STATES THAT THE EYE DOCTOR RECOMMENDED THAT HE GET AN ECHOCARDIOGRAM THE PATIENT STATES THAT HE HAS ONE ABOUT 6 MONTHS AGO HE WOULD LIKE  TO KNOW IF HE NEEDS TO HAVE ANOTHER ONE DONE     "

## 2023-08-31 ENCOUNTER — HOSPITAL ENCOUNTER (OUTPATIENT)
Dept: SLEEP MEDICINE | Facility: HOSPITAL | Age: 68
End: 2023-08-31
Payer: MEDICARE

## 2023-08-31 VITALS — BODY MASS INDEX: 28.14 KG/M2 | WEIGHT: 201 LBS | HEIGHT: 71 IN

## 2023-08-31 DIAGNOSIS — G47.33 OSA (OBSTRUCTIVE SLEEP APNEA): ICD-10-CM

## 2023-08-31 PROCEDURE — 95806 SLEEP STUDY UNATT&RESP EFFT: CPT

## 2023-09-01 NOTE — TELEPHONE ENCOUNTER
HUB TO READ     Left message for patient that per Areli she would like to see him in the office to discuss. Office number given to schedule an appointment with pcp.

## 2023-09-06 ENCOUNTER — OFFICE VISIT (OUTPATIENT)
Dept: INTERNAL MEDICINE | Facility: CLINIC | Age: 68
End: 2023-09-06
Payer: MEDICARE

## 2023-09-06 VITALS
HEIGHT: 71 IN | BODY MASS INDEX: 28.56 KG/M2 | OXYGEN SATURATION: 96 % | WEIGHT: 204 LBS | DIASTOLIC BLOOD PRESSURE: 82 MMHG | SYSTOLIC BLOOD PRESSURE: 124 MMHG | TEMPERATURE: 97.2 F | HEART RATE: 77 BPM

## 2023-09-06 DIAGNOSIS — E78.5 DYSLIPIDEMIA: ICD-10-CM

## 2023-09-06 DIAGNOSIS — H35.30 MACULAR DEGENERATION OF BOTH EYES, UNSPECIFIED TYPE: Primary | ICD-10-CM

## 2023-09-06 DIAGNOSIS — G45.3 AMAUROSIS FUGAX OF RIGHT EYE: ICD-10-CM

## 2023-09-06 NOTE — PROGRESS NOTES
Office Note     Name: Stefan Hughes    : 1955     MRN: [id number]     Chief Complaint  Follow-up and Eye Problem    Subjective     History of Present Illness:  Stefan Hughes is a 68 y.o. male who presents today to discuss further imaging recommended by his retina specialist.  Patient follows with Dr. Jimenez and was recommended to have an echocardiogram and bilateral carotid duplex to further evaluate for any signs of possible CVA.  Patient was diagnosed with amaurosis fugax of the right eye.  He reports on  he woke up and the vision to his right eye was completely blacked out.  He reported this happened for about 15 to 25 seconds and then resolved spontaneously.  He does have macular degeneration in that eye.  He was newly diagnosed with amaurosis fugax.  Dr. Jimenez recommends further evaluation as this could be a precursor to a cardiovascular event.  Patient denies any known family history of CVA.  Blood pressure is controlled in the office today.  Patient does not currently take blood pressure medication.  Patient does take atorvastatin for hyperlipidemia.  Patient denies further complaints or concerns at this time.  Had a pleasant visit with the patient today.      Past Medical History:   Diagnosis Date    Abnormal heart rhythm     Anxiety     Bipolar 1 disorder, mixed     Depression     Intermediate stage nonexudative age-related macular degeneration of both eyes        Past Surgical History:   Procedure Laterality Date    APPENDECTOMY         Social History     Socioeconomic History    Marital status: Single   Tobacco Use    Smoking status: Former     Packs/day: 0.50     Years: 0.00     Pack years: 0.00     Types: Cigarettes     Start date: 1995     Quit date: 1997     Years since quittin.3    Smokeless tobacco: Never   Substance and Sexual Activity    Alcohol use: Never    Drug use: Not Currently     Types: Marijuana    Sexual activity: Not Currently         Current  "Outpatient Medications:     atorvastatin (LIPITOR) 10 MG tablet, Take 1 tablet by mouth Daily., Disp: 90 tablet, Rfl: 1    buPROPion XL (WELLBUTRIN XL) 300 MG 24 hr tablet, Take 1 tablet by mouth Daily., Disp: , Rfl:     diazePAM (VALIUM) 5 MG tablet, TAKE 1-2 TABLETS BY MOUTH THREE TIMES A DAY AS DIRECTED, Disp: , Rfl:     lamoTRIgine (LaMICtal) 150 MG tablet, Take 2 tablets by mouth Daily., Disp: , Rfl:     methylphenidate (RITALIN) 20 MG tablet, Take 1 tablet by mouth 2 (Two) Times a Day., Disp: , Rfl:     multivitamins-minerals (PRESERVISION AREDS 2) capsule capsule, Take 1 capsule by mouth Daily., Disp: , Rfl:     OLANZapine (zyPREXA) 15 MG tablet, , Disp: , Rfl:     QUEtiapine (SEROquel) 25 MG tablet, , Disp: , Rfl:     Viibryd 40 MG tablet tablet, TAKE 1/2 TO 1 TABLET BY MOUTH IN THE MORNING AS DIRECTED, Disp: , Rfl:     Objective     Vital Signs  /82   Pulse 77   Temp 97.2 °F (36.2 °C)   Ht 180.3 cm (70.98\")   Wt 92.5 kg (204 lb)   SpO2 96%   BMI 28.47 kg/m²   Estimated body mass index is 28.47 kg/m² as calculated from the following:    Height as of this encounter: 180.3 cm (70.98\").    Weight as of this encounter: 92.5 kg (204 lb).           Physical Exam  Constitutional:       Appearance: Normal appearance.   HENT:      Head: Normocephalic and atraumatic.      Nose: Nose normal.   Eyes:      Extraocular Movements: Extraocular movements intact.      Conjunctiva/sclera: Conjunctivae normal.      Pupils: Pupils are equal, round, and reactive to light.      Comments: Glasses in place   Cardiovascular:      Rate and Rhythm: Normal rate.   Pulmonary:      Effort: Pulmonary effort is normal.   Musculoskeletal:         General: Normal range of motion.      Cervical back: Normal range of motion and neck supple.   Skin:     General: Skin is warm and dry.   Neurological:      General: No focal deficit present.      Mental Status: He is alert and oriented to person, place, and time. Mental status is at " baseline.   Psychiatric:         Mood and Affect: Mood normal.         Behavior: Behavior normal.         Thought Content: Thought content normal.         Judgment: Judgment normal.        Assessment and Plan     Diagnoses and all orders for this visit:    1. Macular degeneration of both eyes, unspecified type (Primary)    2. Amaurosis fugax of right eye  -     Adult Transthoracic Echo Complete W/ Cont if Necessary Per Protocol; Future  -     Duplex Carotid Ultrasound CAR; Future    3. Dyslipidemia    Plan:  Orders placed for echo.  Orders placed for bilateral carotid duplex.  Will fax results to Dr. Jimenez.  Continue with recommendations per Dr. Jimenez.  Further plan of care based on imaging results.  Keep scheduled follow-up appointment.  Return to clinic sooner if needed.    Follow Up  Return if symptoms worsen or fail to improve.    LINA Guy    Part of this note may be an electronic transcription/translation of spoken language to printed text using the Dragon Dictation System.

## 2023-09-11 ENCOUNTER — TELEPHONE (OUTPATIENT)
Dept: INTERNAL MEDICINE | Facility: CLINIC | Age: 68
End: 2023-09-11

## 2023-09-11 NOTE — TELEPHONE ENCOUNTER
PATIENT IS CALLING TO FOLLOW UP ON HIS ECHOCARDIOGRAM. HE'S NOT HEARD FROM ANYONE TO SCHEDULE IT.    PHONE: [phone]

## 2023-12-06 ENCOUNTER — OFFICE VISIT (OUTPATIENT)
Dept: INTERNAL MEDICINE | Facility: CLINIC | Age: 68
End: 2023-12-06
Payer: MEDICARE

## 2023-12-06 VITALS
HEART RATE: 56 BPM | DIASTOLIC BLOOD PRESSURE: 72 MMHG | OXYGEN SATURATION: 94 % | WEIGHT: 205 LBS | SYSTOLIC BLOOD PRESSURE: 102 MMHG | TEMPERATURE: 96.5 F | HEIGHT: 71 IN | BODY MASS INDEX: 28.7 KG/M2

## 2023-12-06 DIAGNOSIS — R41.82 ALTERED MENTAL STATUS, UNSPECIFIED ALTERED MENTAL STATUS TYPE: Primary | ICD-10-CM

## 2023-12-07 ENCOUNTER — LAB (OUTPATIENT)
Dept: LAB | Facility: HOSPITAL | Age: 68
End: 2023-12-07
Payer: MEDICARE

## 2023-12-07 LAB
ALBUMIN SERPL-MCNC: 4.3 G/DL (ref 3.5–5.2)
ALBUMIN/GLOB SERPL: 1.8 G/DL
ALP SERPL-CCNC: 67 U/L (ref 39–117)
ALT SERPL W P-5'-P-CCNC: 17 U/L (ref 1–41)
ANION GAP SERPL CALCULATED.3IONS-SCNC: 10.3 MMOL/L (ref 5–15)
AST SERPL-CCNC: 15 U/L (ref 1–40)
BILIRUB SERPL-MCNC: 0.5 MG/DL (ref 0–1.2)
BUN SERPL-MCNC: 19 MG/DL (ref 8–23)
BUN/CREAT SERPL: 14.7 (ref 7–25)
CALCIUM SPEC-SCNC: 9.7 MG/DL (ref 8.6–10.5)
CHLORIDE SERPL-SCNC: 107 MMOL/L (ref 98–107)
CO2 SERPL-SCNC: 23.7 MMOL/L (ref 22–29)
CREAT SERPL-MCNC: 1.29 MG/DL (ref 0.76–1.27)
DEPRECATED RDW RBC AUTO: 43.8 FL (ref 37–54)
EGFRCR SERPLBLD CKD-EPI 2021: 59.6 ML/MIN/1.73
ERYTHROCYTE [DISTWIDTH] IN BLOOD BY AUTOMATED COUNT: 13.2 % (ref 12.3–15.4)
GLOBULIN UR ELPH-MCNC: 2.4 GM/DL
GLUCOSE SERPL-MCNC: 97 MG/DL (ref 65–99)
HCT VFR BLD AUTO: 44.1 % (ref 37.5–51)
HGB BLD-MCNC: 14.3 G/DL (ref 13–17.7)
HOLD SPECIMEN: NORMAL
MCH RBC QN AUTO: 30.2 PG (ref 26.6–33)
MCHC RBC AUTO-ENTMCNC: 32.4 G/DL (ref 31.5–35.7)
MCV RBC AUTO: 93 FL (ref 79–97)
PLATELET # BLD AUTO: 222 10*3/MM3 (ref 140–450)
PMV BLD AUTO: 11.1 FL (ref 6–12)
POTASSIUM SERPL-SCNC: 4.5 MMOL/L (ref 3.5–5.2)
PROT SERPL-MCNC: 6.7 G/DL (ref 6–8.5)
RBC # BLD AUTO: 4.74 10*6/MM3 (ref 4.14–5.8)
SODIUM SERPL-SCNC: 141 MMOL/L (ref 136–145)
TSH SERPL DL<=0.05 MIU/L-ACNC: 0.62 UIU/ML (ref 0.27–4.2)
VIT B12 BLD-MCNC: 675 PG/ML (ref 211–946)
WBC NRBC COR # BLD AUTO: 8.01 10*3/MM3 (ref 3.4–10.8)

## 2023-12-07 PROCEDURE — 82607 VITAMIN B-12: CPT | Performed by: NURSE PRACTITIONER

## 2023-12-07 PROCEDURE — 80053 COMPREHEN METABOLIC PANEL: CPT | Performed by: NURSE PRACTITIONER

## 2023-12-07 PROCEDURE — 85027 COMPLETE CBC AUTOMATED: CPT | Performed by: NURSE PRACTITIONER

## 2023-12-07 PROCEDURE — 36415 COLL VENOUS BLD VENIPUNCTURE: CPT | Performed by: NURSE PRACTITIONER

## 2023-12-07 PROCEDURE — 84443 ASSAY THYROID STIM HORMONE: CPT | Performed by: NURSE PRACTITIONER

## 2023-12-07 PROCEDURE — 81001 URINALYSIS AUTO W/SCOPE: CPT | Performed by: NURSE PRACTITIONER

## 2023-12-08 LAB
BACTERIA UR QL AUTO: ABNORMAL /HPF
BILIRUB UR QL STRIP: NEGATIVE
CLARITY UR: CLEAR
COD CRY URNS QL: ABNORMAL /HPF
COLOR UR: YELLOW
GLUCOSE UR STRIP-MCNC: NEGATIVE MG/DL
HGB UR QL STRIP.AUTO: ABNORMAL
HYALINE CASTS UR QL AUTO: ABNORMAL /LPF
KETONES UR QL STRIP: ABNORMAL
LEUKOCYTE ESTERASE UR QL STRIP.AUTO: NEGATIVE
MUCOUS THREADS URNS QL MICRO: ABNORMAL /HPF
NITRITE UR QL STRIP: NEGATIVE
PH UR STRIP.AUTO: 5.5 [PH] (ref 5–8)
PROT UR QL STRIP: ABNORMAL
RBC # UR STRIP: ABNORMAL /HPF
REF LAB TEST METHOD: ABNORMAL
SP GR UR STRIP: 1.03 (ref 1–1.03)
SQUAMOUS #/AREA URNS HPF: ABNORMAL /HPF
UROBILINOGEN UR QL STRIP: ABNORMAL
WBC # UR STRIP: ABNORMAL /HPF

## 2023-12-15 ENCOUNTER — TELEPHONE (OUTPATIENT)
Dept: INTERNAL MEDICINE | Facility: CLINIC | Age: 68
End: 2023-12-15
Payer: COMMERCIAL

## 2023-12-15 NOTE — TELEPHONE ENCOUNTER
HUB to relay message, please give message below.        ----- Message from LINA Guy sent at 12/14/2023  4:39 PM EST -----  Please let the patient know I reviewed his labs.  No anemia or infection noted on his blood count.  Thyroid level is normal.  Vitamin B12 level is normal.  Kidney function is slightly low but has improved from 6 months ago.  Sodium and other electrolytes are normal.  Liver functions are normal.  Please let me know if the patient has any questions.  ThanksMinerva

## 2023-12-18 ENCOUNTER — TELEPHONE (OUTPATIENT)
Dept: INTERNAL MEDICINE | Facility: CLINIC | Age: 68
End: 2023-12-18
Payer: COMMERCIAL

## 2023-12-18 NOTE — TELEPHONE ENCOUNTER
----- Message from LINA Guy sent at 12/14/2023  4:39 PM EST -----  Please let the patient know I reviewed his labs.  No anemia or infection noted on his blood count.  Thyroid level is normal.  Vitamin B12 level is normal.  Kidney function is slightly low but has improved from 6 months ago.  Sodium and other electrolytes are normal.  Liver functions are normal.  Please let me know if the patient has any questions.  ThanksMinerva

## 2023-12-26 NOTE — PROGRESS NOTES
Office Note     Name: Stefan Hughes    : 1955     MRN: [id number]     Chief Complaint  Anxiety, Altered Mental Status, and Fall (About 2 weeks ago at the gas station. Does not recall being dizzy )    Subjective     History of Present Illness:  Stefan Hughes is a 68 y.o. male who presents today with his brother and niece for evaluation of altered mental status and recent fall.  Patient does have macular degeneration and had injections of both of his eyes earlier today.  His family feels that his gait has changed recently.  They feel he has been doing more of a shuffling gait.  They also feel there has been some intermittent confusion most days.  This has been ongoing for about 1 month.  They also feel he has been more withdrawn recently with the time change.  Patient does have seasonal affective disorder and does become more withdrawn and depressed during the winter months.  He does follow with Dr. Wren with behavioral health.  He is seeing him for 22 years now.  They did make some medication changes at the last in person visit.  He has been on psych medications for many years.  He does have an episode of anxiety that was worsened yesterday when he was having some chest discomfort.  He reports those symptoms have resolved.  He has been tested for tardive dyskinesia twice in the past.  He reports the first time he tested positive.  The second time he is unsure.  He has not been taking the olanzapine due to an increase in selena symptoms.  He is also not currently taking Seroquel.  He reports he was unable to tolerate this medication.  His brother describes an event about 2 weeks ago where the patient was at the gas station and fell hitting his head.  The patient does not recall all of the details of this event.  Patient did not go to the ED for evaluation at that time.  Patient is currently taking Wellbutrin 300 mg daily, diazepam as needed, Lamictal 300 mg daily, and Viibryd 40 mg daily.  He is not  currently taking Ritalin, Zyprexa, or Seroquel.  No further complaints or concerns at this time.  Patient is agreeable to lab work this week.  Pleasant visit with the patient and family today.      Past Medical History:   Diagnosis Date    Abnormal heart rhythm     Anxiety     Bipolar 1 disorder, mixed     Depression     Intermediate stage nonexudative age-related macular degeneration of both eyes        Past Surgical History:   Procedure Laterality Date    APPENDECTOMY         Social History     Socioeconomic History    Marital status: Single   Tobacco Use    Smoking status: Former     Packs/day: 0.50     Years: 0.00     Additional pack years: 0.00     Total pack years: 0.00     Types: Cigarettes     Start date: 1995     Quit date: 1997     Years since quittin.6    Smokeless tobacco: Never   Substance and Sexual Activity    Alcohol use: Never    Drug use: Not Currently     Types: Marijuana    Sexual activity: Not Currently         Current Outpatient Medications:     atorvastatin (LIPITOR) 10 MG tablet, Take 1 tablet by mouth Daily., Disp: 90 tablet, Rfl: 1    buPROPion XL (WELLBUTRIN XL) 300 MG 24 hr tablet, Take 1 tablet by mouth Daily., Disp: , Rfl:     diazePAM (VALIUM) 5 MG tablet, TAKE 1-2 TABLETS BY MOUTH THREE TIMES A DAY AS DIRECTED, Disp: , Rfl:     lamoTRIgine (LaMICtal) 150 MG tablet, Take 2 tablets by mouth Daily., Disp: , Rfl:     multivitamins-minerals (PRESERVISION AREDS 2) capsule capsule, Take 1 capsule by mouth Daily., Disp: , Rfl:     Viibryd 40 MG tablet tablet, TAKE 1/2 TO 1 TABLET BY MOUTH IN THE MORNING AS DIRECTED, Disp: , Rfl:     methylphenidate (RITALIN) 20 MG tablet, Take 1 tablet by mouth 2 (Two) Times a Day. (Patient not taking: Reported on 2023), Disp: , Rfl:     OLANZapine (zyPREXA) 15 MG tablet, , Disp: , Rfl:     QUEtiapine (SEROquel) 25 MG tablet, , Disp: , Rfl:     Objective     Vital Signs  /72   Pulse 56   Temp 96.5 °F (35.8 °C)   Ht 180.3 cm  "(70.98\")   Wt 93 kg (205 lb)   SpO2 94%   BMI 28.60 kg/m²   Estimated body mass index is 28.6 kg/m² as calculated from the following:    Height as of this encounter: 180.3 cm (70.98\").    Weight as of this encounter: 93 kg (205 lb).           Physical Exam  Constitutional:       Appearance: Normal appearance.   HENT:      Head: Normocephalic and atraumatic.      Nose: Nose normal.      Mouth/Throat:      Mouth: Mucous membranes are dry.   Eyes:      Extraocular Movements: Extraocular movements intact.      Conjunctiva/sclera: Conjunctivae normal.      Pupils: Pupils are equal, round, and reactive to light.      Comments: Glasses in place   Cardiovascular:      Rate and Rhythm: Normal rate.   Pulmonary:      Effort: Pulmonary effort is normal. No respiratory distress.   Musculoskeletal:         General: Normal range of motion.      Cervical back: Normal range of motion and neck supple.      Comments: Gait slow and cautious   Skin:     General: Skin is warm and dry.   Neurological:      General: No focal deficit present.      Mental Status: He is alert and oriented to person, place, and time. Mental status is at baseline.      Comments: Slow to respond at times   Psychiatric:         Mood and Affect: Mood normal.         Behavior: Behavior normal.         Thought Content: Thought content normal.         Judgment: Judgment normal.          Assessment and Plan     Diagnoses and all orders for this visit:    1. Altered mental status, unspecified altered mental status type (Primary)  -     CBC (No Diff)  -     Comprehensive metabolic panel  -     Urinalysis With Culture If Indicated - Urine, Random Void  -     TSH Rfx On Abnormal To Free T4  -     Vitamin B12  -     Sullivan Urine Culture Tube - Urine, Random Void  -     Urinalysis, Microscopic Only - Urine, Random Void        Follow Up  Return in about 3 months (around 3/6/2024), or if symptoms worsen or fail to improve, for Recheck.    LINA Guy    Part of " this note may be an electronic transcription/translation of spoken language to printed text using the Dragon Dictation System.

## 2024-02-21 DIAGNOSIS — E78.2 MIXED HYPERLIPIDEMIA: ICD-10-CM

## 2024-02-21 RX ORDER — ATORVASTATIN CALCIUM 10 MG/1
10 TABLET, FILM COATED ORAL DAILY
Qty: 90 TABLET | Refills: 1 | Status: SHIPPED | OUTPATIENT
Start: 2024-02-21

## 2024-04-26 ENCOUNTER — TELEPHONE (OUTPATIENT)
Dept: INTERNAL MEDICINE | Facility: CLINIC | Age: 69
End: 2024-04-26
Payer: COMMERCIAL

## 2024-04-26 NOTE — TELEPHONE ENCOUNTER
DARVIN CALLED, HE IS BIPOLAR AND HIS PSYCHIATRIST IS ON VACATION AND HE IS HAVING A MILD MANIC EPISODE, HE'S VERY UNCOMFORTABLE AND AGITATED. NO HALLUCINATIONS. HE WOULD LIKE TO KNOW IF SOME SORT OF SEDATIVE CAN BE CALLED IN. PLEASE CALL HIM BACK AT  [phone].

## 2024-04-30 ENCOUNTER — OFFICE VISIT (OUTPATIENT)
Dept: INTERNAL MEDICINE | Facility: CLINIC | Age: 69
End: 2024-04-30
Payer: MEDICARE

## 2024-04-30 VITALS
BODY MASS INDEX: 28.28 KG/M2 | OXYGEN SATURATION: 96 % | DIASTOLIC BLOOD PRESSURE: 76 MMHG | HEART RATE: 66 BPM | WEIGHT: 202 LBS | HEIGHT: 71 IN | SYSTOLIC BLOOD PRESSURE: 118 MMHG | RESPIRATION RATE: 18 BRPM

## 2024-04-30 DIAGNOSIS — R26.89 LOSS OF BALANCE: ICD-10-CM

## 2024-04-30 DIAGNOSIS — G47.00 INSOMNIA, UNSPECIFIED TYPE: ICD-10-CM

## 2024-04-30 DIAGNOSIS — F41.9 ANXIETY: ICD-10-CM

## 2024-04-30 DIAGNOSIS — F31.74 BIPOLAR DISORDER, IN FULL REMISSION, MOST RECENT EPISODE MANIC: Primary | ICD-10-CM

## 2024-04-30 DIAGNOSIS — R53.1 GENERALIZED WEAKNESS: ICD-10-CM

## 2024-04-30 PROCEDURE — 99213 OFFICE O/P EST LOW 20 MIN: CPT | Performed by: NURSE PRACTITIONER

## 2024-04-30 RX ORDER — MIRTAZAPINE 15 MG/1
15 TABLET, FILM COATED ORAL NIGHTLY
COMMUNITY
Start: 2024-03-04

## 2024-04-30 RX ORDER — LORAZEPAM 2 MG/1
1 TABLET ORAL
COMMUNITY
Start: 2024-03-14

## 2024-04-30 NOTE — TELEPHONE ENCOUNTER
Can you please contact this patient and asked him how he was doing.  His he is still feeling manic?  If so he needs to be back on his olanzapine.  Please let me know how he is doing.

## 2024-04-30 NOTE — PROGRESS NOTES
Office Note     Name: Stefan Hughes    : 1955     MRN: [id number]     Chief Complaint  Manic Behavior (Manic episode, 2024), Fall (Loss of balance, BLE weakness), and Insomnia    Subjective     History of Present Illness:  Stefan Hughes is a 69 y.o. male who presents today for discussion of a recent manic episode.  Patient reports Friday of last week he was having a really rough day.  He reports experiencing severe manic symptoms and that the symptoms were very overwhelming.  He placed a call to our office on Friday to notify us of his symptoms as well as his psychiatrist being on vacation.  Today he reports feeling much better.  He reports he is through the most recent manic phase.  He follows with Dr. Wren with psychiatry.  He has followed with him for many years.  He has made some medication adjustments recently.  He reports stopping the Viibryd.  He had also stopped the Zyprexa.  He did temporarily restart this medication during his manic episode.  He reports he has since stopped this medication again.  Dr. Wren allowed him to stop due to morning fogginess.  He was also having issues with insomnia and sleep disturbance during his manic episode.  He was previously on Valium which reports it was ineffective.  He took one of his brothers Valium during this most recent episode and reports he responded well to it and was able to rest.  He reports being anxiety free for about the past 10 days.  He reports anxiety started around Thanksgi.  He also wanted to make note of his episode and symptoms for his chart as he may be establishing with a new psychiatrist at some point to manage his medications as Dr. Wren will likely be retiring.  He also needs to reschedule his echo and carotid duplex.  He was unable to accommodate these tests with his schedule at the time.  Patient is feeling much better today and wanted to follow-up.  No further complaints or concerns at this time.  Patient presents today with  "his brother.  Pleasant visit with the patient today.        Past Medical History:   Diagnosis Date    Abnormal heart rhythm     Anxiety     Bipolar 1 disorder, mixed     Depression     Intermediate stage nonexudative age-related macular degeneration of both eyes        Past Surgical History:   Procedure Laterality Date    APPENDECTOMY         Social History     Socioeconomic History    Marital status: Single   Tobacco Use    Smoking status: Former     Current packs/day: 0.00     Average packs/day: 0.5 packs/day for 2.2 years (1.1 ttl pk-yrs)     Types: Cigarettes     Start date: 1995     Quit date: 1997     Years since quittin.0    Smokeless tobacco: Never   Substance and Sexual Activity    Alcohol use: Never    Drug use: Not Currently     Types: Marijuana    Sexual activity: Not Currently         Current Outpatient Medications:     atorvastatin (LIPITOR) 10 MG tablet, TAKE 1 TABLET BY MOUTH DAILY., Disp: 90 tablet, Rfl: 1    buPROPion XL (WELLBUTRIN XL) 300 MG 24 hr tablet, Take 1 tablet by mouth Daily., Disp: , Rfl:     lamoTRIgine (LaMICtal) 150 MG tablet, Take 2 tablets by mouth Daily., Disp: , Rfl:     LORazepam (ATIVAN) 2 MG tablet, Take 0.5 tablets by mouth., Disp: , Rfl:     methylphenidate (RITALIN) 20 MG tablet, Take 1.5 tablets by mouth 2 (Two) Times a Day., Disp: , Rfl:     mirtazapine (REMERON) 15 MG tablet, Take 1 tablet by mouth Every Night., Disp: , Rfl:     multivitamins-minerals (PRESERVISION AREDS 2) capsule capsule, Take 1 capsule by mouth Daily., Disp: , Rfl:     OLANZapine (zyPREXA) 15 MG tablet, , Disp: , Rfl:     QUEtiapine (SEROquel) 25 MG tablet, , Disp: , Rfl:     Objective     Vital Signs  /76 (BP Location: Left arm, Patient Position: Sitting, Cuff Size: Adult)   Pulse 66   Resp 18   Ht 180.3 cm (71\")   Wt 91.6 kg (202 lb)   SpO2 96%   BMI 28.17 kg/m²   Estimated body mass index is 28.17 kg/m² as calculated from the following:    Height as of this encounter: " "180.3 cm (71\").    Weight as of this encounter: 91.6 kg (202 lb).           Physical Exam  Constitutional:       Appearance: Normal appearance.   HENT:      Head: Normocephalic and atraumatic.      Nose: Nose normal.      Mouth/Throat:      Mouth: Mucous membranes are dry.   Eyes:      Extraocular Movements: Extraocular movements intact.      Conjunctiva/sclera: Conjunctivae normal.      Pupils: Pupils are equal, round, and reactive to light.      Comments: Glasses in place   Cardiovascular:      Rate and Rhythm: Normal rate.   Pulmonary:      Effort: Pulmonary effort is normal. No respiratory distress.   Musculoskeletal:         General: Normal range of motion.      Cervical back: Normal range of motion and neck supple.   Skin:     General: Skin is warm and dry.   Neurological:      General: No focal deficit present.      Mental Status: He is alert and oriented to person, place, and time. Mental status is at baseline.   Psychiatric:         Mood and Affect: Mood normal.         Behavior: Behavior normal.         Thought Content: Thought content normal.         Judgment: Judgment normal.          Assessment and Plan     Diagnoses and all orders for this visit:    1. Bipolar disorder, in full remission, most recent episode manic (Primary)    2. Anxiety    3. Loss of balance    4. Generalized weakness    5. Insomnia, unspecified type        Follow Up  Return for Next scheduled follow up.    LINA Guy    Part of this note may be an electronic transcription/translation of spoken language to printed text using the Dragon Dictation System.  "

## 2024-05-10 ENCOUNTER — HOSPITAL ENCOUNTER (OUTPATIENT)
Facility: HOSPITAL | Age: 69
Discharge: HOME | End: 2024-05-10
Payer: MEDICARE

## 2024-05-10 VITALS — HEIGHT: 71 IN | WEIGHT: 202 LBS | BODY MASS INDEX: 28.28 KG/M2

## 2024-05-10 DIAGNOSIS — G45.3 AMAUROSIS FUGAX OF RIGHT EYE: ICD-10-CM

## 2024-05-10 LAB
AORTIC ARCH: 3.5 CM
ASCENDING AORTA: 4.3 CM
BH CV ECHO MEAS - AI P1/2T: 454.5 MSEC
BH CV ECHO MEAS - AO MAX PG: 5.7 MMHG
BH CV ECHO MEAS - AO MEAN PG: 3 MMHG
BH CV ECHO MEAS - AO ROOT DIAM: 3.9 CM
BH CV ECHO MEAS - AO V2 MAX: 119.7 CM/SEC
BH CV ECHO MEAS - AO V2 VTI: 27.4 CM
BH CV ECHO MEAS - AVA(I,D): 2.16 CM2
BH CV ECHO MEAS - EDV(CUBED): 91.1 ML
BH CV ECHO MEAS - EDV(MOD-SP2): 163 ML
BH CV ECHO MEAS - EDV(MOD-SP4): 133 ML
BH CV ECHO MEAS - EF(MOD-BP): 55.4 %
BH CV ECHO MEAS - EF(MOD-SP2): 57.9 %
BH CV ECHO MEAS - EF(MOD-SP4): 52.9 %
BH CV ECHO MEAS - ESV(CUBED): 27 ML
BH CV ECHO MEAS - ESV(MOD-SP2): 68.7 ML
BH CV ECHO MEAS - ESV(MOD-SP4): 62.7 ML
BH CV ECHO MEAS - FS: 33.3 %
BH CV ECHO MEAS - IVS/LVPW: 1.1 CM
BH CV ECHO MEAS - IVSD: 1.1 CM
BH CV ECHO MEAS - LA DIMENSION: 4.1 CM
BH CV ECHO MEAS - LAT PEAK E' VEL: 7 CM/SEC
BH CV ECHO MEAS - LV DIASTOLIC VOL/BSA (35-75): 62.8 CM2
BH CV ECHO MEAS - LV MASS(C)D: 164 GRAMS
BH CV ECHO MEAS - LV MAX PG: 1.37 MMHG
BH CV ECHO MEAS - LV MEAN PG: 1 MMHG
BH CV ECHO MEAS - LV SYSTOLIC VOL/BSA (12-30): 29.6 CM2
BH CV ECHO MEAS - LV V1 MAX: 58.6 CM/SEC
BH CV ECHO MEAS - LV V1 VTI: 14.2 CM
BH CV ECHO MEAS - LVIDD: 4.5 CM
BH CV ECHO MEAS - LVIDS: 3 CM
BH CV ECHO MEAS - LVOT AREA: 4.2 CM2
BH CV ECHO MEAS - LVOT DIAM: 2.3 CM
BH CV ECHO MEAS - LVPWD: 1 CM
BH CV ECHO MEAS - MED PEAK E' VEL: 7.5 CM/SEC
BH CV ECHO MEAS - MV A MAX VEL: 75.3 CM/SEC
BH CV ECHO MEAS - MV DEC SLOPE: 165 CM/SEC2
BH CV ECHO MEAS - MV DEC TIME: 0.31 SEC
BH CV ECHO MEAS - MV E MAX VEL: 53.3 CM/SEC
BH CV ECHO MEAS - MV E/A: 0.71
BH CV ECHO MEAS - MV P1/2T: 129.8 MSEC
BH CV ECHO MEAS - MVA(P1/2T): 1.7 CM2
BH CV ECHO MEAS - PA ACC TIME: 0.13 SEC
BH CV ECHO MEAS - PA V2 MAX: 96.2 CM/SEC
BH CV ECHO MEAS - PAPD(PI EDV): 2 MMHG
BH CV ECHO MEAS - PI END-D VEL: 68.2 CM/SEC
BH CV ECHO MEAS - RAP SYSTOLE: 8 MMHG
BH CV ECHO MEAS - RVSP: 31 MMHG
BH CV ECHO MEAS - SV(LVOT): 59 ML
BH CV ECHO MEAS - SV(MOD-SP2): 94.3 ML
BH CV ECHO MEAS - SV(MOD-SP4): 70.3 ML
BH CV ECHO MEAS - SVI(LVOT): 27.9 ML/M2
BH CV ECHO MEAS - SVI(MOD-SP2): 44.5 ML/M2
BH CV ECHO MEAS - SVI(MOD-SP4): 33.2 ML/M2
BH CV ECHO MEAS - TAPSE (>1.6): 2.07 CM
BH CV ECHO MEAS - TR MAX PG: 23 MMHG
BH CV ECHO MEAS - TR MAX VEL: 241 CM/SEC
BH CV ECHO MEASUREMENTS AVERAGE E/E' RATIO: 7.35
BH CV ECHO SHUNT ASSESSMENT PERFORMED (HIDDEN SCRIPTING): 1
BH CV VAS BP LEFT ARM: NORMAL MMHG
BH CV XLRA - RV BASE: 4 CM
BH CV XLRA - RV LENGTH: 8.1 CM
BH CV XLRA - RV MID: 3.9 CM
BH CV XLRA - TDI S': 12.2 CM/SEC
BH CV XLRA MEAS LEFT DIST CCA EDV: 24.6 CM/SEC
BH CV XLRA MEAS LEFT DIST CCA PSV: 81.6 CM/SEC
BH CV XLRA MEAS LEFT DIST ICA EDV: 31.5 CM/SEC
BH CV XLRA MEAS LEFT DIST ICA PSV: 80.9 CM/SEC
BH CV XLRA MEAS LEFT ICA/CCA RATIO: 1.07
BH CV XLRA MEAS LEFT MID CCA EDV: 14.7 CM/SEC
BH CV XLRA MEAS LEFT MID CCA PSV: 75.7 CM/SEC
BH CV XLRA MEAS LEFT MID ICA EDV: 26.3 CM/SEC
BH CV XLRA MEAS LEFT MID ICA PSV: 73.8 CM/SEC
BH CV XLRA MEAS LEFT PROX CCA EDV: 17.3 CM/SEC
BH CV XLRA MEAS LEFT PROX CCA PSV: 72.8 CM/SEC
BH CV XLRA MEAS LEFT PROX ECA EDV: 15.4 CM/SEC
BH CV XLRA MEAS LEFT PROX ECA PSV: 88.3 CM/SEC
BH CV XLRA MEAS LEFT PROX ICA EDV: 14.7 CM/SEC
BH CV XLRA MEAS LEFT PROX ICA PSV: 65.4 CM/SEC
BH CV XLRA MEAS LEFT PROX SCLA PSV: 103 CM/SEC
BH CV XLRA MEAS LEFT VERTEBRAL A EDV: 13 CM/SEC
BH CV XLRA MEAS LEFT VERTEBRAL A PSV: 42.4 CM/SEC
BH CV XLRA MEAS RIGHT DIST CCA EDV: 21.7 CM/SEC
BH CV XLRA MEAS RIGHT DIST CCA PSV: 102 CM/SEC
BH CV XLRA MEAS RIGHT DIST ICA EDV: 38.7 CM/SEC
BH CV XLRA MEAS RIGHT DIST ICA PSV: 101 CM/SEC
BH CV XLRA MEAS RIGHT ICA/CCA RATIO: 1.61
BH CV XLRA MEAS RIGHT MID CCA EDV: 28 CM/SEC
BH CV XLRA MEAS RIGHT MID CCA PSV: 85.7 CM/SEC
BH CV XLRA MEAS RIGHT MID ICA EDV: 48.2 CM/SEC
BH CV XLRA MEAS RIGHT MID ICA PSV: 138 CM/SEC
BH CV XLRA MEAS RIGHT PROX CCA EDV: 22.4 CM/SEC
BH CV XLRA MEAS RIGHT PROX CCA PSV: 87 CM/SEC
BH CV XLRA MEAS RIGHT PROX ECA EDV: 19.6 CM/SEC
BH CV XLRA MEAS RIGHT PROX ECA PSV: 116 CM/SEC
BH CV XLRA MEAS RIGHT PROX ICA EDV: 16.7 CM/SEC
BH CV XLRA MEAS RIGHT PROX ICA PSV: 56.2 CM/SEC
BH CV XLRA MEAS RIGHT PROX SCLA PSV: 77 CM/SEC
BH CV XLRA MEAS RIGHT VERTEBRAL A EDV: 11.5 CM/SEC
BH CV XLRA MEAS RIGHT VERTEBRAL A PSV: 40.5 CM/SEC
IVRT: 109 MS
LEFT ARM BP: NORMAL MMHG
LEFT ATRIUM VOLUME INDEX: 28.9 ML/M2
LV EF 2D ECHO EST: 55 %
RIGHT ARM BP: NORMAL MMHG

## 2024-05-10 PROCEDURE — 93306 TTE W/DOPPLER COMPLETE: CPT

## 2024-05-10 PROCEDURE — 93880 EXTRACRANIAL BILAT STUDY: CPT

## 2024-09-24 ENCOUNTER — OFFICE VISIT (OUTPATIENT)
Dept: INTERNAL MEDICINE | Facility: CLINIC | Age: 69
End: 2024-09-24
Payer: MEDICARE

## 2024-09-24 VITALS
WEIGHT: 204.4 LBS | HEIGHT: 71 IN | OXYGEN SATURATION: 96 % | HEART RATE: 102 BPM | DIASTOLIC BLOOD PRESSURE: 62 MMHG | SYSTOLIC BLOOD PRESSURE: 124 MMHG | BODY MASS INDEX: 28.61 KG/M2

## 2024-09-24 DIAGNOSIS — N63.42 SUBAREOLAR MASS OF LEFT BREAST: ICD-10-CM

## 2024-09-24 DIAGNOSIS — F31.74 BIPOLAR DISORDER, IN FULL REMISSION, MOST RECENT EPISODE MANIC: Primary | ICD-10-CM

## 2024-09-24 DIAGNOSIS — N64.4 BREAST PAIN IN MALE: ICD-10-CM

## 2024-09-24 PROCEDURE — 1125F AMNT PAIN NOTED PAIN PRSNT: CPT | Performed by: NURSE PRACTITIONER

## 2024-09-24 PROCEDURE — 1159F MED LIST DOCD IN RCRD: CPT | Performed by: NURSE PRACTITIONER

## 2024-09-24 PROCEDURE — 99213 OFFICE O/P EST LOW 20 MIN: CPT | Performed by: NURSE PRACTITIONER

## 2024-09-24 PROCEDURE — 1160F RVW MEDS BY RX/DR IN RCRD: CPT | Performed by: NURSE PRACTITIONER

## 2024-09-24 RX ORDER — DIAZEPAM 10 MG
10 TABLET ORAL NIGHTLY PRN
COMMUNITY

## 2024-09-24 NOTE — PROGRESS NOTES
Office Note     Name: Stefan Hughes    : 1955     MRN: [id number]     Chief Complaint  Mass (Patient reports he has a lump under his left nipple. Patient states that both of his nipples get sore and and he has had this since he was in high school. )    Subjective     History of Present Illness:  Stefan Hughes is a 69 y.o. male who presents today for discussion of breast concerns.    Patient noted a change in his breast and nipples around the age of puberty.  He started to experience intermittent pain at that time.  He has continued to experience this pain intermittently over the years.  He reports that currently occurs a couple times a year where he will experience pain in both breasts and tenderness to both nipples.  He reports the pain is better today but was present about 2 weeks ago.  He has also noted a lump or mass to his left nipple area.  He denies any nipple drainage or inversion of nipples.  No known family history of breast cancer.    Patient has been on psychiatric medications since around the age of 14-15.  This is around the time when he noticed a change in his breast and nipples.  He has establish care with a new psychiatrist recently.  He has only had 2 visits with her but seems pleased with the progress they are making.  He has a follow-up in 2 weeks.  His previous psych provider he was with for 50 years.  The patient feels that he may have been overmedicated.  He is currently taking lamotrigine 300 mg daily, methylphenidate 30 mg twice daily, mirtazapine 15 mg nightly, olanzapine 10 mg nightly, quetiapine 25 mg nightly, and diazepam 10 mg as needed.  He is no longer taking bupropion.  Overall, he is tolerating these medication changes well and is feeling his mental health is currently in a better place.  He denies any recent manic episodes.    No further complaints or concerns at this time.  Pleasant visit with the patient and his niece today.      Past Medical History:  "  Diagnosis Date    Abnormal heart rhythm     Anxiety     Bipolar 1 disorder, mixed     Depression     Intermediate stage nonexudative age-related macular degeneration of both eyes        Past Surgical History:   Procedure Laterality Date    APPENDECTOMY         Social History     Socioeconomic History    Marital status: Single   Tobacco Use    Smoking status: Former     Current packs/day: 0.00     Average packs/day: 0.5 packs/day for 2.2 years (1.1 ttl pk-yrs)     Types: Cigarettes     Start date: 1995     Quit date: 1997     Years since quittin.4    Smokeless tobacco: Never   Vaping Use    Vaping status: Never Used   Substance and Sexual Activity    Alcohol use: Never    Drug use: Not Currently     Types: Marijuana    Sexual activity: Not Currently         Current Outpatient Medications:     atorvastatin (LIPITOR) 10 MG tablet, TAKE 1 TABLET BY MOUTH DAILY., Disp: 90 tablet, Rfl: 1    lamoTRIgine (LaMICtal) 150 MG tablet, Take 2 tablets by mouth Daily., Disp: , Rfl:     methylphenidate (RITALIN) 20 MG tablet, Take 1.5 tablets by mouth 2 (Two) Times a Day., Disp: , Rfl:     mirtazapine (REMERON) 15 MG tablet, Take 1 tablet by mouth Every Night., Disp: , Rfl:     multivitamins-minerals (PRESERVISION AREDS 2) capsule capsule, Take 1 capsule by mouth Daily., Disp: , Rfl:     OLANZapine (zyPREXA) 15 MG tablet, Take 10 mg by mouth Every Night., Disp: , Rfl:     QUEtiapine (SEROquel) 25 MG tablet, Take 1 tablet by mouth Every Night., Disp: , Rfl:     diazePAM (VALIUM) 10 MG tablet, Take 1 tablet by mouth At Night As Needed for Anxiety., Disp: , Rfl:     Objective     Vital Signs  /62   Pulse 102   Ht 180.3 cm (71\")   Wt 92.7 kg (204 lb 6.4 oz)   SpO2 96%   BMI 28.51 kg/m²   Estimated body mass index is 28.51 kg/m² as calculated from the following:    Height as of this encounter: 180.3 cm (71\").    Weight as of this encounter: 92.7 kg (204 lb 6.4 oz).    BMI is >= 25 and <30. (Overweight) The " following options were offered after discussion;: Not addressed at this visit      Physical Exam  Constitutional:       Appearance: Normal appearance.   HENT:      Head: Normocephalic and atraumatic.      Nose: Nose normal.      Mouth/Throat:      Mouth: Mucous membranes are dry.   Eyes:      Extraocular Movements: Extraocular movements intact.      Conjunctiva/sclera: Conjunctivae normal.      Pupils: Pupils are equal, round, and reactive to light.      Comments: Glasses in place   Cardiovascular:      Rate and Rhythm: Normal rate.   Pulmonary:      Effort: Pulmonary effort is normal. No respiratory distress.   Chest:   Breasts:     Right: Swelling and tenderness present. No inverted nipple, nipple discharge or skin change.      Left: Swelling, mass and tenderness present. No inverted nipple, nipple discharge or skin change.      Comments: Approximate Skip sized nodule noted to left subareolar area, tenderness to palpation, feels mobile on exam.  Musculoskeletal:         General: Normal range of motion.      Cervical back: Normal range of motion and neck supple.   Skin:     General: Skin is warm and dry.   Neurological:      General: No focal deficit present.      Mental Status: He is alert and oriented to person, place, and time. Mental status is at baseline.   Psychiatric:         Mood and Affect: Mood normal.         Behavior: Behavior normal.         Thought Content: Thought content normal.         Judgment: Judgment normal.          Assessment and Plan     Diagnoses and all orders for this visit:    1. Bipolar disorder, in full remission, most recent episode manic (Primary)    2. Subareolar mass of left breast  -     Mammo Diagnostic Digital Tomosynthesis Bilateral With CAD; Future    3. Breast pain in male  -     Mammo Diagnostic Digital Tomosynthesis Bilateral With CAD; Future        Follow Up  Return if symptoms worsen or fail to improve, for Next scheduled follow up.    LINA Guy    Part of this  note may be an electronic transcription/translation of spoken language to printed text using the Dragon Dictation System.

## 2024-10-23 ENCOUNTER — HOSPITAL ENCOUNTER (OUTPATIENT)
Facility: HOSPITAL | Age: 69
Discharge: HOME | End: 2024-10-23
Admitting: NURSE PRACTITIONER
Payer: MEDICARE

## 2024-10-23 DIAGNOSIS — N63.42 SUBAREOLAR MASS OF LEFT BREAST: ICD-10-CM

## 2024-10-23 DIAGNOSIS — N64.4 BREAST PAIN IN MALE: ICD-10-CM

## 2024-10-23 PROCEDURE — G0279 TOMOSYNTHESIS, MAMMO: HCPCS

## 2024-10-23 PROCEDURE — 77066 DX MAMMO INCL CAD BI: CPT

## 2025-07-24 ENCOUNTER — TRANSCRIBE ORDERS (OUTPATIENT)
Dept: ADMINISTRATIVE | Facility: HOSPITAL | Age: 70
End: 2025-07-24
Payer: MEDICARE

## 2025-07-24 DIAGNOSIS — R26.9 ABNORMALITY OF GAIT: ICD-10-CM

## 2025-07-24 DIAGNOSIS — R41.82 ALTERED MENTAL STATUS, UNSPECIFIED ALTERED MENTAL STATUS TYPE: Primary | ICD-10-CM

## 2025-08-04 ENCOUNTER — HOSPITAL ENCOUNTER (OUTPATIENT)
Dept: CT IMAGING | Facility: HOSPITAL | Age: 70
Discharge: HOME | End: 2025-08-04
Admitting: NURSE PRACTITIONER
Payer: MEDICARE

## 2025-08-04 DIAGNOSIS — R26.9 ABNORMALITY OF GAIT: ICD-10-CM

## 2025-08-04 DIAGNOSIS — R41.82 ALTERED MENTAL STATUS, UNSPECIFIED ALTERED MENTAL STATUS TYPE: ICD-10-CM

## 2025-08-04 PROCEDURE — 70460 CT HEAD/BRAIN W/DYE: CPT

## 2025-08-04 PROCEDURE — 25510000001 IOPAMIDOL 61 % SOLUTION: Performed by: NURSE PRACTITIONER

## 2025-08-04 RX ORDER — IOPAMIDOL 612 MG/ML
90 INJECTION, SOLUTION INTRAVASCULAR
Status: COMPLETED | OUTPATIENT
Start: 2025-08-04 | End: 2025-08-04

## 2025-08-04 RX ADMIN — IOPAMIDOL 90 ML: 612 INJECTION, SOLUTION INTRAVENOUS at 15:25
